# Patient Record
Sex: FEMALE | Race: BLACK OR AFRICAN AMERICAN | NOT HISPANIC OR LATINO
[De-identification: names, ages, dates, MRNs, and addresses within clinical notes are randomized per-mention and may not be internally consistent; named-entity substitution may affect disease eponyms.]

---

## 2019-11-07 ENCOUNTER — RX ONLY (OUTPATIENT)
Age: 69
Setting detail: RX ONLY
End: 2019-11-07

## 2019-11-07 ENCOUNTER — APPOINTMENT (RX ONLY)
Dept: URBAN - METROPOLITAN AREA CLINIC 45 | Facility: CLINIC | Age: 69
Setting detail: DERMATOLOGY
End: 2019-11-07

## 2019-11-07 ENCOUNTER — APPOINTMENT (RX ONLY)
Dept: URBAN - METROPOLITAN AREA CLINIC 44 | Facility: CLINIC | Age: 69
Setting detail: DERMATOLOGY
End: 2019-11-07

## 2019-11-07 DIAGNOSIS — L81.0 POSTINFLAMMATORY HYPERPIGMENTATION: ICD-10-CM

## 2019-11-07 DIAGNOSIS — L28.1 PRURIGO NODULARIS: ICD-10-CM

## 2019-11-07 PROBLEM — Z85.3 PERSONAL HISTORY OF MALIGNANT NEOPLASM OF BREAST: Status: ACTIVE | Noted: 2019-11-07

## 2019-11-07 PROBLEM — E13.9 OTHER SPECIFIED DIABETES MELLITUS WITHOUT COMPLICATIONS: Status: ACTIVE | Noted: 2019-11-07

## 2019-11-07 PROBLEM — M12.9 ARTHROPATHY, UNSPECIFIED: Status: ACTIVE | Noted: 2019-11-07

## 2019-11-07 PROCEDURE — 99203 OFFICE O/P NEW LOW 30 MIN: CPT

## 2019-11-07 PROCEDURE — ? COUNSELING

## 2019-11-07 PROCEDURE — ? PRESCRIPTION

## 2019-11-07 PROCEDURE — ? ADDITIONAL NOTES

## 2019-11-07 PROCEDURE — ? ORDER TESTS

## 2019-11-07 RX ORDER — BETAMETHASONE DIPROPIONATE 0.5 MG/G
1 OINTMENT TOPICAL TWICE DAILY
Qty: 1 | Refills: 1 | Status: ERX | COMMUNITY
Start: 2019-11-07

## 2019-11-07 RX ADMIN — BETAMETHASONE DIPROPIONATE 1: 0.5 OINTMENT TOPICAL at 15:47

## 2019-11-07 ASSESSMENT — LOCATION SIMPLE DESCRIPTION DERM
LOCATION SIMPLE: LEFT POSTERIOR UPPER ARM
LOCATION SIMPLE: LOWER BACK
LOCATION SIMPLE: LEFT FOREARM
LOCATION SIMPLE: RIGHT FOREARM
LOCATION SIMPLE: LEFT FOREARM
LOCATION SIMPLE: LEFT UPPER BACK
LOCATION SIMPLE: LEFT POSTERIOR UPPER ARM
LOCATION SIMPLE: UPPER BACK
LOCATION SIMPLE: RIGHT POSTERIOR UPPER ARM
LOCATION SIMPLE: RIGHT POSTERIOR UPPER ARM
LOCATION SIMPLE: LOWER BACK
LOCATION SIMPLE: LEFT UPPER BACK
LOCATION SIMPLE: UPPER BACK
LOCATION SIMPLE: RIGHT FOREARM

## 2019-11-07 ASSESSMENT — LOCATION DETAILED DESCRIPTION DERM
LOCATION DETAILED: RIGHT DISTAL POSTERIOR UPPER ARM
LOCATION DETAILED: RIGHT DISTAL POSTERIOR UPPER ARM
LOCATION DETAILED: RIGHT PROXIMAL POSTERIOR UPPER ARM
LOCATION DETAILED: LEFT PROXIMAL POSTERIOR UPPER ARM
LOCATION DETAILED: SUPERIOR LUMBAR SPINE
LOCATION DETAILED: LEFT PROXIMAL DORSAL FOREARM
LOCATION DETAILED: LEFT PROXIMAL DORSAL FOREARM
LOCATION DETAILED: LEFT SUPERIOR MEDIAL UPPER BACK
LOCATION DETAILED: SUPERIOR THORACIC SPINE
LOCATION DETAILED: RIGHT PROXIMAL POSTERIOR UPPER ARM
LOCATION DETAILED: LEFT PROXIMAL POSTERIOR UPPER ARM
LOCATION DETAILED: RIGHT PROXIMAL DORSAL FOREARM
LOCATION DETAILED: SUPERIOR THORACIC SPINE
LOCATION DETAILED: SUPERIOR LUMBAR SPINE
LOCATION DETAILED: RIGHT PROXIMAL DORSAL FOREARM
LOCATION DETAILED: LEFT SUPERIOR MEDIAL UPPER BACK

## 2019-11-07 ASSESSMENT — LOCATION ZONE DERM
LOCATION ZONE: TRUNK
LOCATION ZONE: TRUNK
LOCATION ZONE: ARM
LOCATION ZONE: ARM

## 2019-11-07 NOTE — PROCEDURE: ADDITIONAL NOTES
Detail Level: Detailed
Additional Notes: PN and PIH with signs of excoriations along both arms and across reachable part of upper back.  Present x few years.\\n\\nPatient has history of breast cancer -right breast\\nS/P lumpectomy with SRT \\n\\nPatient has +++ anxiety about breast cancer history because family member (aunt?)  from metastatic breast cancer that presented as itching.  So I will check some screening labs today.\\n\\nDr. Pate\\nJuly 2019 labs\\nCMP-normal \\n6.2 -Hemoglobin A1c consistent with diabetes\\n\\nStart Diprolene ointment twice daily x 4 weeks.\\n\\nRTC 4 weeks.  Consider biopsy if something suspicious.

## 2019-11-07 NOTE — PROCEDURE: MIPS QUALITY
Quality 226: Preventive Care And Screening: Tobacco Use: Screening And Cessation Intervention: Patient screened for tobacco use and is an ex/non-smoker
Quality 431: Preventive Care And Screening: Unhealthy Alcohol Use - Screening: Patient screened for unhealthy alcohol use using a single question and scores less than 2 times per year
Additional Notes: Patient does not get the flu or pneumo vaccination
Quality 110: Preventive Care And Screening: Influenza Immunization: Influenza Immunization not Administered for Documented Reasons.
Quality 111:Pneumonia Vaccination Status For Older Adults: Pneumococcal Vaccination not Administered or Previously Received, Reason not Otherwise Specified
Name And Contact Information For Health Care Proxy: Amando Bertram \\[phone]
Detail Level: Zone
Quality 47: Advance Care Plan: Advance Care Planning discussed and documented; advance care plan or surrogate decision maker documented in the medical record.
Quality 130: Documentation Of Current Medications In The Medical Record: Current Medications Documented

## 2019-11-07 NOTE — PROCEDURE: MIPS QUALITY
Quality 110: Preventive Care And Screening: Influenza Immunization: Influenza Immunization not Administered for Documented Reasons.
Name And Contact Information For Health Care Proxy: Gurdeep Maureen \\[phone]
Additional Notes: Patient does not get the flu or pneumo vaccination
Quality 431: Preventive Care And Screening: Unhealthy Alcohol Use - Screening: Patient screened for unhealthy alcohol use using a single question and scores less than 2 times per year
Quality 226: Preventive Care And Screening: Tobacco Use: Screening And Cessation Intervention: Patient screened for tobacco use and is an ex/non-smoker
Quality 111:Pneumonia Vaccination Status For Older Adults: Pneumococcal Vaccination not Administered or Previously Received, Reason not Otherwise Specified
Quality 130: Documentation Of Current Medications In The Medical Record: Current Medications Documented
Detail Level: Zone
Quality 47: Advance Care Plan: Advance Care Planning discussed and documented; advance care plan or surrogate decision maker documented in the medical record.

## 2019-11-07 NOTE — PROCEDURE: ORDER TESTS
Expected Date Of Service: 11/07/2019
Lab Facility: [id number]
Billing Type: Third-Party Bill
Performing Laboratory: [id number]
Bill For Surgical Tray: no

## 2019-11-07 NOTE — PROCEDURE: ADDITIONAL NOTES
Detail Level: Detailed
Additional Notes: PN and PIH with signs of excoriations along both arms and across reachable part of upper back.  Present x few years.\\n\\nPatient has history of breast cancer -right breast\\nS/P lumpectomy with SRT \\n\\nPatient has +++ anxiety about breast cancer history because family member (aunt?)  from metastatic breast cancer that presented as itching.  So I will check some screening labs today.\\n\\nDr. Del Real\\nJuly 2019 labs\\nCMP-normal \\n6.2 -Hemoglobin A1c consistent with diabetes\\n\\nStart Diprolene ointment twice daily x 4 weeks.\\n\\nRTC 4 weeks.  Consider biopsy if something suspicious.

## 2022-01-31 ENCOUNTER — HOSPITAL ENCOUNTER (EMERGENCY)
Dept: HOSPITAL 5 - ED | Age: 72
Discharge: HOME | End: 2022-01-31
Payer: SELF-PAY

## 2022-01-31 VITALS — SYSTOLIC BLOOD PRESSURE: 149 MMHG | DIASTOLIC BLOOD PRESSURE: 70 MMHG

## 2022-01-31 DIAGNOSIS — R55: ICD-10-CM

## 2022-01-31 DIAGNOSIS — Z88.1: ICD-10-CM

## 2022-01-31 DIAGNOSIS — M79.651: Primary | ICD-10-CM

## 2022-01-31 LAB
BUN SERPL-MCNC: 24 MG/DL (ref 7–17)
BUN/CREAT SERPL: 27 %
CALCIUM SERPL-MCNC: 9.8 MG/DL (ref 8.4–10.2)
HCT VFR BLD CALC: 32.2 % (ref 30.3–42.9)
HEMOLYSIS INDEX: 3
HGB BLD-MCNC: 10.5 GM/DL (ref 10.1–14.3)
MCHC RBC AUTO-ENTMCNC: 33 % (ref 30–34)
MCV RBC AUTO: 95 FL (ref 79–97)
PLATELET # BLD: 248 K/MM3 (ref 140–440)
RBC # BLD AUTO: 3.41 M/MM3 (ref 3.65–5.03)

## 2022-01-31 PROCEDURE — 70450 CT HEAD/BRAIN W/O DYE: CPT

## 2022-01-31 PROCEDURE — 80048 BASIC METABOLIC PNL TOTAL CA: CPT

## 2022-01-31 PROCEDURE — 84484 ASSAY OF TROPONIN QUANT: CPT

## 2022-01-31 PROCEDURE — 93005 ELECTROCARDIOGRAM TRACING: CPT

## 2022-01-31 PROCEDURE — 72125 CT NECK SPINE W/O DYE: CPT

## 2022-01-31 PROCEDURE — 36415 COLL VENOUS BLD VENIPUNCTURE: CPT

## 2022-01-31 PROCEDURE — 99283 EMERGENCY DEPT VISIT LOW MDM: CPT

## 2022-01-31 PROCEDURE — 85027 COMPLETE CBC AUTOMATED: CPT

## 2022-01-31 PROCEDURE — 72170 X-RAY EXAM OF PELVIS: CPT

## 2022-01-31 PROCEDURE — 71046 X-RAY EXAM CHEST 2 VIEWS: CPT

## 2022-01-31 PROCEDURE — 93010 ELECTROCARDIOGRAM REPORT: CPT

## 2022-01-31 NOTE — XRAY REPORT
XR pelvis 1-2V



INDICATION / CLINICAL INFORMATION:

Fall.



COMPARISON: 

None available.



FINDINGS:

Mild cortical deformity along the superior margin of the left pubic body most likely represents remot
e fracture. No acute fracture is identified. The pubic symphysis and SI joints are intact. The sacrum
 is obscured by bowel gas and stool. Hip joints are intact with mild bilateral osteoarthritis.



Signer Name: Yobani Bowden MD 

Signed: 1/31/2022 2:20 PM

Workstation Name: [email or web address] [id number]

## 2022-01-31 NOTE — CAT SCAN REPORT
CT head/brain wo con



INDICATION:

trauma w/loc.



TECHNIQUE: Routine CT head. All CT scans at this location are performed using CT dose reduction for A
INEZ by means of automated exposure control.



COMPARISON: 

None.



FINDINGS:



Intracranial: Gray-white matter differentiation is maintained. No intracranial hemorrhage. No extra a
xial collection. No hydrocephalus. No herniation.

Sinuses: Paranasal sinuses and mastoid air cells are essentially clear.

Orbits: Globes are intact. 

Calvarium: No acute fracture.





IMPRESSION:

1.  No acute intracranial abnormality.



Signer Name: Anuj Hamilton MD 

Signed: 1/31/2022 1:45 PM

Workstation Name: [id number]

## 2022-01-31 NOTE — EMERGENCY DEPARTMENT REPORT
ED General Adult HPI





- General


Chief complaint: Weakness


Stated complaint: REF BY DOC/WEAKNESS


Time Seen by Provider: 01/31/22 12:53


Source: patient


Mode of arrival: Ambulatory


Limitations: No Limitations





- History of Present Illness


Initial comments: 





Patient presented with family secondary to generalized weakness.  She had 

actually fallen on Saturday.  Patient is uncertain whether she slipped and fell 

or whether she fainted.  She was in the kitchen.  She was getting cup of ice 

when she fell.  She states that she came to lying on the ground.  She believes 

that she hit her head and was obviously knocked out.  The cup of ice had spilled

everywhere.  Patient states that she crawled to their bedroom, and was able to 

call her .  He came home and helped her get up.  She states that she 

spent yesterday lying in bed.  She was complaining of pain in the medial aspect 

of the right thigh.  This was in the proximal area.  She thought that she had 

just injured her leg.  Unfortunately, she was not able to get up and move her 

leg.  She states that her leg feels weak now.  She called her [facility] physician 

and was told to come here that this could be a stroke.  She states that she 

noticed this weakness yesterday and was told to come in yesterday.  She and her 

 decided to come in today instead.  Patient has ongoing headache, 

although she states that the headache is improved.  She denies chest pain.  She 

has not noticed any weakness of the arms.  Pain in the right medial thigh is 

described as aching.


Severity scale (0 -10): 4





- Related Data


                                    Allergies











Allergy/AdvReac Type Severity Reaction Status Date / Time


 


cefdinir Allergy  Itching Verified 01/31/22 13:12














ED Review of Systems


ROS: 


Stated complaint: REF BY DOC/WEAKNESS


Other details as noted in HPI





Comment: All other systems reviewed and negative


Constitutional: denies: fever


Eyes: denies: vision change


ENT: denies: throat pain


Respiratory: denies: cough


Cardiovascular: denies: chest pain


Endocrine: denies: unexplained weight loss


Gastrointestinal: denies: abdominal pain


Genitourinary: denies: dysuria


Musculoskeletal: denies: back pain


Skin: denies: rash


Neurological: as per HPI


Hematological/Lymphatic: denies: easy bruising





ED Past Medical Hx





- Past Medical History


Hx Hypertension: Yes





- Family History


Family history: hypertension





ED Physical Exam





- General


Limitations: No Limitations, Other (Pulse ox noted and normal)


General appearance: alert, in no apparent distress





- Head


Head exam: Present: atraumatic, normocephalic





- Eye


Eye exam: Present: normal appearance, EOMI.  Absent: scleral icterus





- ENT


ENT exam: Present: normal orophraynx, normal external ear exam





- Neck


Neck exam: Present: normal inspection.  Absent: tenderness, meningismus





- Respiratory


Respiratory exam: Present: normal lung sounds bilaterally.  Absent: respiratory 

distress





- Cardiovascular


Cardiovascular Exam: Present: regular rate, normal rhythm





- GI/Abdominal


GI/Abdominal exam: Present: soft.  Absent: distended, tenderness





- Extremities Exam


Extremities exam: Present: normal capillary refill





- Back Exam


Back exam: Absent: CVA tenderness (R)





- Neurological Exam


Neurological exam: Present: alert, oriented X3, CN II-XII intact, reflexes 

normal, other (Patient seems to have weakness involving the right leg at the hip

flexor area.  It is difficult to determine whether this is pain mediated or true

weakness.)





- Psychiatric


Psychiatric exam: Present: normal affect, normal mood





- Skin


Skin exam: Present: warm, dry





ED Course


                                   Vital Signs











  01/31/22 01/31/22





  12:54 14:05


 


Temperature 98.0 F 


 


Pulse Rate 98 H 


 


Respiratory 16 





Rate  


 


Blood Pressure 117/45 





[Left]  


 


O2 Sat by Pulse 98 98





Oximetry  














- Reevaluation(s)


Reevaluation #1: 





01/31/22 13:06


Labs, CT, and x-rays were ordered.  Old records noted.


Reevaluation #2: 





01/31/22 14:26


EKG noted.


Reevaluation #3: 





01/31/22 15:41


Work-up was complete and the patient was discharged





ED Medical Decision Making





- Lab Data


Result diagrams: 


                                 01/31/22 14:29





                                 01/31/22 14:29





Rhythm strip: Normal sinus rhythm without ectopy.  Monitor observe 10 seconds.





- EKG Data


-: EKG Interpreted by Me





- EKG Data


When compared to previous EKG there are: previous EKG unavailable





01/31/22 14:27


1421-EKG shows normal sinus rhythm at 74.  Intervals are normal including QRS 

and QT corrected.  The computer is reading a QRS of 135, but this is a narrow c

omplex QRS.  It is not wide.  Patient does have significant artifact and 

wandering baseline.  There is no old EKG for comparison.





- Radiology Data


Radiology results: report reviewed





- Medical Decision Making





Patient presents for a questionable stroke.  She actually seemed to fall and get

 knocked out.  There was no CT evidence of stroke.  She did not have unilateral 

findings suggestive of stroke.  Patient did not have focal weakness.  She had 

complained that her right leg was weak but the seem to be more limited due to 

pain.  She had pain in the musculature of the medial thigh on the right.  She 

did not have any pronator drift.  NIH score was zero.  Along the same lines, 

there is no evidence of subdural or epidural hematoma.  Has no evidence of 

spinal injury based on a negative CT of the C-spine.  Patient does not have evid

ence of pelvic fracture or hip fracture.  She has no obvious metabolic 

derangement although her BUN was elevated.  This would be consistent with some 

mild dehydration.  She was drinking fluids here.  The [facility] physician was 

notified who did agree with outpatient follow-up and will help arrange outp

atient follow-up.


Critical Care Time: No


Critical care attestation.: 


If time is entered above; I have spent that time in minutes in the direct care o

f this critically ill patient, excluding procedure time.








ED Disposition


Clinical Impression: 


 Right thigh pain





Syncope


Qualifiers:


 Syncope type: unspecified Qualified Code(s): R55 - Syncope and collapse





Disposition: 01 HOME / SELF CARE / HOMELESS


Is pt being admited?: No


Condition: Stable


Instructions:  Syncope (ED), How to Use Cold Therapy, Easy-to-Read, Pain Without

 a Known Cause, Syncope


Additional Instructions: 


Apply ice to your leg.  Drink plenty water.  Use Tylenol for pain.  Follow-up 

with your family doctor and your [facility] doctor for recheck.  Return for problems

 or concerns.


Referrals: 


PRIMARY CARE,MD [Referring] - 3-5 Days

## 2022-01-31 NOTE — CAT SCAN REPORT
CT cervical spine wo con



INDICATION / CLINICAL INFORMATION:

71 years Female; trauma w/loc. 



TECHNIQUE:

Axial CT images of the cervical spine were obtained.  Sagittal and coronal reformatted images were pr
oduced. All CT scans at this location are performed using CT dose reduction for ALARA by means of aut
omated exposure control. 



COMPARISON: 

None available.



FINDINGS:



POST-SURGICAL CHANGES: None.



ALIGNMENT: There is no significant spondylolisthesis of the cervical spine. There is slight curvature
 of the spine, convex toward the right.



VERTEBRAE: There is notable disc space narrowing with endplate changes at C4-5. Moderate findings are
 noted at C5-6 and mild anteriorly at C6-7. However, there is no clear CT evidence of acute fracture 
of the cervical spine.  



INTRAVERTEBRAL DISCS: The hypertrophic changes posterior to the odontoid appear to efface ventral sub
arachnoid space without direct cord compression. There is a central osteophyte/disc complex at C3-4 w
hich also effaces the subarachnoid space. There is mild left foraminal narrowing.



The more broad-based spondylosis at C4-5 appears to minimally encroach on the ventral cord. There is 
moderate to marked left neural foraminal narrowing.



The spondylosis at C5-6 also effaces the ventral subarachnoid space. There is moderate to marked left
 and moderate right foraminal narrowing. Milder narrowing is noted at C6-7.



PARASPINAL SOFT TISSUES: No prevertebral soft tissue fluid collections are identified. This mild athe
rosclerotic calcification involving carotid bifurcations.



ADDITIONAL FINDINGS: None.



IMPRESSION:

1. There is no CT evidence of acute fracture involving the cervical spine.

2. There are multilevel degenerative changes as detailed above.  



Signer Name: Alejandro Valdes MD 

Signed: 1/31/2022 3:57 PM

Workstation Name: [id number]

## 2022-01-31 NOTE — XRAY REPORT
CHEST 2 VIEWS 



INDICATION: 

fall.



COMPARISON: 

None



FINDINGS:



SUPPORT DEVICES: None.



HEART: Within normal limits. 



LUNGS/PLEURA: No acute air space or interstitial disease.  No pneumothorax.



ADDITIONAL FINDINGS: None.







IMPRESSION:

1. No acute findings.



Signer Name: Pradeep Sykes MD 

Signed: 1/31/2022 2:18 PM

Workstation Name: [id number]

## 2022-02-02 NOTE — ELECTROCARDIOGRAPH REPORT
[facility]

                                       

Test Date:    2022               Test Time:    14:21:39

Pat Name:     RICHARD JAFFE        Department:   

Patient ID:   [id number]          Room:          

Gender:       F                        Technician:   waleska

:          1950               Requested By: JOSEPH VARGAS

Order Number: [id number]              Reading MD:   Casey Trejo

                                 Measurements

Intervals                              Axis          

Rate:         74                       P:            

SD:                                    QRS:          29

QRSD:         135                      T:            17

QT:           375                                    

QTc:          430                                    

                           Interpretive Statements

Poor quality ECG

Normal sinus rhythm

No previous ECG available for comparison

Electronically Signed On 2022 10:12:07 EST by Casey Terjo